# Patient Record
Sex: MALE | Race: WHITE | NOT HISPANIC OR LATINO | Employment: UNEMPLOYED | URBAN - METROPOLITAN AREA
[De-identification: names, ages, dates, MRNs, and addresses within clinical notes are randomized per-mention and may not be internally consistent; named-entity substitution may affect disease eponyms.]

---

## 2023-11-14 ENCOUNTER — HOSPITAL ENCOUNTER (EMERGENCY)
Facility: HOSPITAL | Age: 23
Discharge: HOME | End: 2023-11-14
Attending: EMERGENCY MEDICINE
Payer: MEDICARE

## 2023-11-14 VITALS
HEART RATE: 80 BPM | OXYGEN SATURATION: 98 % | DIASTOLIC BLOOD PRESSURE: 64 MMHG | RESPIRATION RATE: 16 BRPM | SYSTOLIC BLOOD PRESSURE: 122 MMHG | TEMPERATURE: 99.3 F

## 2023-11-14 DIAGNOSIS — M25.562 ACUTE PAIN OF LEFT KNEE: Primary | ICD-10-CM
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
## 2023-11-14 PROCEDURE — 99283 EMERGENCY DEPT VISIT LOW MDM: CPT

## 2023-11-14 PROCEDURE — 99284 EMERGENCY DEPT VISIT MOD MDM: CPT | Performed by: EMERGENCY MEDICINE

## 2023-11-14 RX ORDER — IBUPROFEN 600 MG/1
600 TABLET ORAL ONCE
Status: COMPLETED | OUTPATIENT
Start: 2023-11-14 | End: 2023-11-14

## 2023-11-14 RX ADMIN — IBUPROFEN 600 MG: 600 TABLET, FILM COATED ORAL at 18:36

## 2023-11-14 NOTE — DISCHARGE INSTRUCTIONS
You were seen in the emergency department for left knee pain. Your symptoms and exam are consistent with an overuse injury. Take ibuprofen for your pain. If you continue to have pain call the attached number to schedule an appointment with orthopedic surgery. If you have worsening pain or any new concerning symptoms please return to the emergency department.

## 2023-11-14 NOTE — ED PROVIDER NOTES
History  Chief Complaint   Patient presents with    Knee Pain     Per pt he went for a long walk Thursday (50 miles) and started having left knee pain since. 42-year-old male who presents complaining of left knee pain that began 5 days ago after he walked 15 miles. The patient states that he has pain in the front of his left knee that is worse with movement. The patient states that he is able to walk but unable to climb stairs due to the pain. The patient states that he has had sensations of locking and popping of his knee. The patient denies any falls or trauma to the knee. The patient denies any numbness or weakness in his left lower extremity. Patient denies fever, chills, recent illness, chest pain, shortness of breath, nausea, vomiting, abdominal pain. None       History reviewed. No pertinent past medical history. History reviewed. No pertinent surgical history. History reviewed. No pertinent family history. I have reviewed and agree with the history as documented. E-Cigarette/Vaping     E-Cigarette/Vaping Substances     Social History     Tobacco Use    Smoking status: Never    Smokeless tobacco: Never   Substance Use Topics    Drug use: Never        Review of Systems   Constitutional:  Negative for chills and fever. HENT:  Negative for congestion and sore throat. Eyes:  Negative for pain and redness. Respiratory:  Negative for cough and shortness of breath. Cardiovascular:  Negative for chest pain and palpitations. Gastrointestinal:  Negative for abdominal pain, diarrhea, nausea and vomiting. Genitourinary:  Negative for dysuria and hematuria. Musculoskeletal:  Positive for arthralgias and gait problem. Negative for joint swelling and myalgias. Skin:  Negative for color change, pallor and rash. Neurological:  Negative for syncope, weakness, light-headedness, numbness and headaches. All other systems reviewed and are negative.       Physical Exam  ED Triage Vitals   Temperature Pulse Respirations Blood Pressure SpO2   11/14/23 1803 11/14/23 1803 11/14/23 1803 11/14/23 1803 11/14/23 1803   99.3 °F (37.4 °C) 80 16 122/64 98 %      Temp Source Heart Rate Source Patient Position - Orthostatic VS BP Location FiO2 (%)   11/14/23 1803 11/14/23 1803 11/14/23 1803 11/14/23 1803 --   Temporal Monitor Sitting Left arm       Pain Score       11/14/23 1804       5             Orthostatic Vital Signs  Vitals:    11/14/23 1803   BP: 122/64   Pulse: 80   Patient Position - Orthostatic VS: Sitting       Physical Exam  Constitutional:       General: He is not in acute distress. Appearance: Normal appearance. He is not ill-appearing, toxic-appearing or diaphoretic. HENT:      Head: Normocephalic and atraumatic. Nose: Nose normal.      Mouth/Throat:      Mouth: Mucous membranes are moist.      Pharynx: Oropharynx is clear. Eyes:      Conjunctiva/sclera: Conjunctivae normal.      Pupils: Pupils are equal, round, and reactive to light. Cardiovascular:      Rate and Rhythm: Normal rate and regular rhythm. Pulses: Normal pulses. Heart sounds: Normal heart sounds. No murmur heard. No friction rub. No gallop. Pulmonary:      Effort: Pulmonary effort is normal.      Breath sounds: Normal breath sounds. No wheezing, rhonchi or rales. Abdominal:      General: Abdomen is flat. Palpations: Abdomen is soft. Tenderness: There is no abdominal tenderness. There is no guarding or rebound. Musculoskeletal:         General: Tenderness present. No swelling or deformity. Normal range of motion. Cervical back: Normal range of motion and neck supple. No rigidity or tenderness. Right lower leg: No edema. Left lower leg: No edema. Comments: Tenderness to palpation over the anterior lateral left knee. No tenderness over the joint line. No effusion. Full range of motion of the left lower extremity. Left lower extremity is neurovascularly intact. Lymphadenopathy:      Cervical: No cervical adenopathy. Skin:     General: Skin is warm and dry. Capillary Refill: Capillary refill takes less than 2 seconds. Coloration: Skin is not jaundiced or pale. Findings: No bruising, erythema, lesion or rash. Neurological:      General: No focal deficit present. Mental Status: He is alert and oriented to person, place, and time. ED Medications  Medications   ibuprofen (MOTRIN) tablet 600 mg (600 mg Oral Given 11/14/23 1836)       Diagnostic Studies  Results Reviewed       None                   No orders to display         Procedures  Procedures      ED Course                                       Medical Decision Making  29-year-old male who presents complaining of left knee pain that began 5 days ago after he walked 15 miles. Vitals are within the normal limits. On exam patient has full range of motion of the left knee, tenderness to palpation over the left anterior/lateral knee, no joint effusion, left lower extremity is neurovascularly intact. Suspect patellofemoral pain or other overuse injury. We will treat the patient with ibuprofen. The patient is given the number for orthopedics to follow-up. Patient is instructed to continue taking ibuprofen for his pain. Return precautions are given and patient is discharged. Risk  Prescription drug management. Disposition  Final diagnoses:   Acute pain of left knee   Overuse injury     Time reflects when diagnosis was documented in both MDM as applicable and the Disposition within this note       Time User Action Codes Description Comment    11/14/2023  6:33 PM Pavan CORDOBA Add [M25.562] Acute pain of left knee     11/14/2023  6:33 PM Katty Kay Add [X50. 3XXA] Overuse injury           ED Disposition       ED Disposition   Discharge    Condition   Stable    Date/Time   Tue Nov 14, 2023  6:33 PM    Comment   Natalya Amaral discharge to home/self care. Follow-up Information       Follow up With Specialties Details Why Contact Info Additional [address] [facility] Emergency Department Emergency Medicine Go to  If symptoms worsen [address] [id number]  [facility] Emergency Department, [address] [place] [address], [address] [place] [address] Orthopedic Surgery Schedule an appointment as soon as possible for a visit  As needed [address] [id number] [phone] [address], [address] [place] [address]  Use Entrance A             Patient's Medications    No medications on file     No discharge procedures on file. PDMP Review       None             ED Provider  Attending physically available and evaluated Mandeep Eaton. I managed the patient along with the ED Attending.     Electronically Signed by           Deng Ray DO  11/14/23 7608

## 2023-11-19 NOTE — ED ATTENDING ATTESTATION
11/14/2023  IJose MD, saw and evaluated the patient. I have discussed the patient with the resident/non-physician practitioner and agree with the resident's/non-physician practitioner's findings, Plan of Care, and MDM as documented in the resident's/non-physician practitioner's note, except where noted. All available labs and Radiology studies were reviewed. I was present for key portions of any procedure(s) performed by the resident/non-physician practitioner and I was immediately available to provide assistance. At this point I agree with the current assessment done in the Emergency Department. I have conducted an independent evaluation of this patient a history and physical is as follows:    ED Course     79-year-old male with left knee pain after a prolonged walk over 50 miles on Thursday. Denies any falls or direct trauma to the knee may have twisted his knee. Had immediate pain with weightbearing. Vitals reviewed. Examination of left lower extremities warm well-perfused neurovascular intact. There is tenderness over the anterior lateral aspect the left knee. No bony point tenderness no effusion no erythema full range of motion passive noted. Impression: Left knee pain  Differential diagnosis: Overuse injury, sprain, strain, doubt fracture, doubt dislocation    We will treat patient with NSAIDs follow-up with PCP as outpatient.       Critical Care Time  Procedures

## 2025-07-09 ENCOUNTER — HOSPITAL ENCOUNTER (EMERGENCY)
Facility: HOSPITAL | Age: 25
Discharge: HOME | End: 2025-07-09
Attending: EMERGENCY MEDICINE

## 2025-07-09 VITALS
RESPIRATION RATE: 17 BRPM | OXYGEN SATURATION: 96 % | DIASTOLIC BLOOD PRESSURE: 61 MMHG | SYSTOLIC BLOOD PRESSURE: 146 MMHG | HEIGHT: 67 IN | TEMPERATURE: 97.9 F | HEART RATE: 103 BPM | WEIGHT: 135 LBS | BODY MASS INDEX: 21.19 KG/M2

## 2025-07-09 DIAGNOSIS — K04.7 DENTAL INFECTION: Primary | ICD-10-CM

## 2025-07-09 PROCEDURE — 96372 THER/PROPH/DIAG INJ SC/IM: CPT

## 2025-07-09 PROCEDURE — 99282 EMERGENCY DEPT VISIT SF MDM: CPT

## 2025-07-09 PROCEDURE — 99284 EMERGENCY DEPT VISIT MOD MDM: CPT | Performed by: EMERGENCY MEDICINE

## 2025-07-09 RX ORDER — DICLOFENAC SODIUM 25 MG/1
75 TABLET, DELAYED RELEASE ORAL 2 TIMES DAILY PRN
Qty: 30 TABLET | Refills: 0 | Status: SHIPPED | OUTPATIENT
Start: 2025-07-09 | End: 2025-07-16

## 2025-07-09 RX ORDER — KETOROLAC TROMETHAMINE 30 MG/ML
30 INJECTION, SOLUTION INTRAMUSCULAR; INTRAVENOUS ONCE
Status: COMPLETED | OUTPATIENT
Start: 2025-07-09 | End: 2025-07-09

## 2025-07-09 RX ADMIN — AMOXICILLIN AND CLAVULANATE POTASSIUM 1 TABLET: 875; 125 TABLET, FILM COATED ORAL at 14:01

## 2025-07-09 RX ADMIN — KETOROLAC TROMETHAMINE 30 MG: 30 INJECTION, SOLUTION INTRAMUSCULAR at 14:01

## 2025-07-09 NOTE — ED PROVIDER NOTES
Time reflects when diagnosis was documented in both MDM as applicable and the Disposition within this note       Time User Action Codes Description Comment    7/9/2025  1:56 PM Adriano Porras Add [K04.7] Dental infection           ED Disposition       ED Disposition   Discharge    Condition   Stable    Date/Time   Wed Jul 9, 2025  1:56 PM    Comment   Vivek R Amaral discharge to home/self care.                   Assessment & Plan       Medical Decision Making  24-year-old male presenting for evaluation of left lower jaw pain and swelling.  Patient does have poor dentition.  He has been dealing with the symptoms for about a month.  Tylenol and Motrin are helping somewhat at home.  Exam is consistent with dental infection.  He has dental caries.  No gingival abscess noted.  Will start patient on NSAIDs and Augmentin.  Will give handout for local dentists.  Discussed the importance of follow-up with dentist.  We discussed the possibility of apical abscess.  Low utility of CT.  Discussed likely Panorex scan at the dentist    Problems Addressed:  Dental infection: acute illness or injury     Details: Will start Augmentin.  Patient understands that he needs to follow-up with dentist    Risk  Prescription drug management.             Medications   ketorolac (TORADOL) injection 30 mg (30 mg Intramuscular Given 7/9/25 1401)   amoxicillin-clavulanate (AUGMENTIN) 875-125 mg per tablet 1 tablet (1 tablet Oral Given 7/9/25 1401)       ED Risk Strat Scores                    No data recorded        SBIRT 20yo+      Flowsheet Row Most Recent Value   Initial Alcohol Screen: US AUDIT-C     1. How often do you have a drink containing alcohol? 0 Filed at: 07/09/2025 1348   2. How many drinks containing alcohol do you have on a typical day you are drinking?  0 Filed at: 07/09/2025 1348   3a. Male UNDER 65: How often do you have five or more drinks on one occasion? 0 Filed at: 07/09/2025 1348   3b. FEMALE Any Age, or MALE 65+: How  often do you have 4 or more drinks on one occassion? 0 Filed at: 07/09/2025 1348   Audit-C Score 0 Filed at: 07/09/2025 1348   ZANE: How many times in the past year have you...    Used an illegal drug or used a prescription medication for non-medical reasons? Never Filed at: 07/09/2025 1348                            History of Present Illness       Chief Complaint   Patient presents with    Facial Swelling     Pt reports a month of left sided facial swelling and jaw/ gum pain. Denies fever, respiratory compromise, or other symptoms        Past Medical History[1]   Past Surgical History[2]   Family History[3]   Social History[4]   E-Cigarette/Vaping      E-Cigarette/Vaping Substances      I have reviewed and agree with the history as documented.     24-year-old male presenting for evaluation of left lower jaw pain and swelling.  Patient does have poor dentition.  He has been dealing with the symptoms for about a month.  Tylenol and Motrin are helping somewhat at home.  He denies fevers        Review of Systems   Constitutional:  Negative for fever.   HENT:  Positive for dental problem.    Musculoskeletal:  Negative for neck pain.   Skin:  Negative for rash.   Neurological:  Negative for weakness.           Objective       ED Triage Vitals [07/09/25 1348]   Temperature Pulse Blood Pressure Respirations SpO2 Patient Position - Orthostatic VS   97.9 °F (36.6 °C) 103 146/61 17 96 % Sitting      Temp Source Heart Rate Source BP Location FiO2 (%) Pain Score    Oral Monitor Left arm -- --      Vitals      Date and Time Temp Pulse SpO2 Resp BP Pain Score FACES Pain Rating User   07/09/25 1348 97.9 °F (36.6 °C) 103 96 % 17 146/61 -- -- JK            Physical Exam  Vitals and nursing note reviewed.   Constitutional:       General: He is not in acute distress.  HENT:      Head: Normocephalic and atraumatic.      Mouth/Throat:      Pharynx: No posterior oropharyngeal erythema.      Comments: Swelling noted to left lower drawl.   No skin changes.  No gingival abscess.  Dental caries noted with multiple fractured teeth    Eyes:      Conjunctiva/sclera: Conjunctivae normal.       Cardiovascular:      Rate and Rhythm: Regular rhythm.   Pulmonary:      Effort: Pulmonary effort is normal. No respiratory distress.   Abdominal:      General: There is no distension.     Musculoskeletal:      Cervical back: No rigidity.     Skin:     General: Skin is warm and dry.     Neurological:      Mental Status: He is alert. Mental status is at baseline.     Psychiatric:         Mood and Affect: Mood normal.         Results Reviewed       None            No orders to display       Procedures    ED Medication and Procedure Management   None     Patient's Medications   Discharge Prescriptions    AMOXICILLIN-CLAVULANATE (AUGMENTIN) 875-125 MG PER TABLET    Take 1 tablet by mouth every 12 (twelve) hours for 7 days       Start Date: 7/9/2025  End Date: 7/16/2025       Order Dose: 1 tablet       Quantity: 14 tablet    Refills: 0    DICLOFENAC SODIUM (VOLTAREN) 25 MG EC TABLET    Take 3 tablets (75 mg total) by mouth 2 (two) times a day as needed (pain) for up to 7 days       Start Date: 7/9/2025  End Date: 7/16/2025       Order Dose: 75 mg       Quantity: 30 tablet    Refills: 0     No discharge procedures on file.  ED SEPSIS DOCUMENTATION   Time reflects when diagnosis was documented in both MDM as applicable and the Disposition within this note       Time User Action Codes Description Comment    7/9/2025  1:56 PM Adriano Porras Add [K04.7] Dental infection                    [1] No past medical history on file.  [2]   Past Surgical History:  Procedure Laterality Date    DENTAL SURGERY     [3] No family history on file.  [4]   Social History  Tobacco Use    Smoking status: Former     Types: Cigarettes    Smokeless tobacco: Never   Substance Use Topics    Alcohol use: Not Currently    Drug use: Never        Adriano Porras DO  07/09/25 [id number]

## 2025-07-09 NOTE — DISCHARGE INSTRUCTIONS
Please return if you develop any worsening symptoms.  You may return at any time if you have any further concerns.  Please follow up with  the dentist in the next few days.